# Patient Record
Sex: FEMALE | Race: WHITE | NOT HISPANIC OR LATINO | Employment: OTHER | URBAN - METROPOLITAN AREA
[De-identification: names, ages, dates, MRNs, and addresses within clinical notes are randomized per-mention and may not be internally consistent; named-entity substitution may affect disease eponyms.]

---

## 2018-06-08 LAB
ALBUMIN SERPL BCP-MCNC: 4.3 G/DL (ref 3.5–5.7)
ALP SERPL-CCNC: 82 IU/L (ref 40–150)
ALT SERPL W P-5'-P-CCNC: 26 IU/L (ref 0–50)
ANION GAP SERPL CALCULATED.3IONS-SCNC: 12.9 MM/L
AST SERPL W P-5'-P-CCNC: 30 U/L (ref 7–26)
BASOPHILS # BLD AUTO: 0 X3/UL (ref 0–0.3)
BASOPHILS # BLD AUTO: 0.4 % (ref 0–2)
BILIRUB SERPL-MCNC: 0.4 MG/DL (ref 0.3–1)
BILIRUB UR QL STRIP: NEGATIVE
BUN SERPL-MCNC: 12 MG/DL (ref 7–25)
C-REACTIVE PROTEIN (HISTORICAL): 2.6 MG/L
CALCIUM SERPL-MCNC: 9.2 MG/DL (ref 8.6–10.5)
CHLORIDE SERPL-SCNC: 101 MM/L (ref 98–107)
CHOLEST SERPL-MCNC: 194 MG/DL (ref 0–200)
CLARITY UR: CLEAR
CO2 SERPL-SCNC: 29 MM/L (ref 21–31)
COLOR UR: YELLOW
CREAT SERPL-MCNC: 0.7 MG/DL (ref 0.6–1.2)
DEPRECATED RDW RBC AUTO: 13.4 %
EGFR (HISTORICAL): > 60 GFR
EGFR AFRICAN AMERICAN (HISTORICAL): > 60 GFR
EOSINOPHIL # BLD AUTO: 0.1 X3/UL (ref 0–0.5)
EOSINOPHIL NFR BLD AUTO: 1.5 % (ref 0–5)
ERYTHROCYTE SEDIMENTATION RATE (HISTORICAL): 18 MM/HR
GLUCOSE (HISTORICAL): 77 MG/DL (ref 65–99)
GLUCOSE UR STRIP-MCNC: NEGATIVE MG/DL
HCT VFR BLD AUTO: 39.9 % (ref 37–47)
HDLC SERPL-MCNC: 80 MG/DL (ref 40–60)
HGB BLD-MCNC: 13.7 G/DL (ref 12–16)
HGB UR QL STRIP.AUTO: NEGATIVE
KETONES UR STRIP-MCNC: NEGATIVE MG/DL
LDLC SERPL CALC-MCNC: 100.2 MG/DL (ref 75–193)
LEUKOCYTE ESTERASE UR QL STRIP: NEGATIVE
LYMPHOCYTES # BLD AUTO: 2 X3/UL (ref 1.2–4.2)
LYMPHOCYTES NFR BLD AUTO: 31.2 % (ref 20.5–51.1)
MCH RBC QN AUTO: 31.6 PG (ref 26–34)
MCHC RBC AUTO-ENTMCNC: 34.2 G/DL (ref 31–37)
MCV RBC AUTO: 92.6 FL (ref 81–99)
MONOCYTES # BLD AUTO: 0.4 X3/UL (ref 0–1)
MONOCYTES NFR BLD AUTO: 6.4 % (ref 1.7–12)
NEUTROPHILS # BLD AUTO: 3.9 X3/UL (ref 1.4–6.5)
NEUTS SEG NFR BLD AUTO: 60.5 % (ref 42.2–75.2)
NITRITE UR QL STRIP: NEGATIVE
OSMOLALITY, SERUM (HISTORICAL): 276 MOSM (ref 262–291)
PH UR STRIP.AUTO: 7 [PH] (ref 4.5–8)
PLATELET # BLD AUTO: 241 X3/UL (ref 130–400)
PLATELET ESTIMATE (HISTORICAL): NORMAL
PMV BLD AUTO: 8.8 FL
POTASSIUM SERPL-SCNC: 3.9 MM/L (ref 3.5–5.5)
PROT UR STRIP-MCNC: NEGATIVE MG/DL
RBC # BLD AUTO: 4.31 X6/UL (ref 3.9–5.2)
RBC MORPHOLOGY (HISTORICAL): NORMAL
SODIUM SERPL-SCNC: 139 MM/L (ref 134–143)
SP GR UR STRIP.AUTO: 1.01 (ref 1–1.03)
TOTAL PROTEIN (HISTORICAL): 6.8 G/DL (ref 6.4–8.9)
TRIGL SERPL-MCNC: 67 MG/DL (ref 44–166)
TSH SERPL DL<=0.05 MIU/L-ACNC: 1.28 UIU/M (ref 0.45–5.33)
UROBILINOGEN UR QL STRIP.AUTO: 0.2 EU/DL (ref 0.2–8)
VLDL CHOLESTEROL (HISTORICAL): 13 MG/DL (ref 5–51)
WBC # BLD AUTO: 6.4 X3/UL (ref 4.8–10.8)
WBC #/AREA URNS AUTO: NORMAL /HPF

## 2018-06-09 LAB — HIV1 (HISTORICAL): NON REACTIVE

## 2019-06-12 ENCOUNTER — TRANSCRIBE ORDERS (OUTPATIENT)
Dept: ADMINISTRATIVE | Facility: HOSPITAL | Age: 49
End: 2019-06-12

## 2019-06-12 DIAGNOSIS — M06.9 RHEUMATOID ARTHRITIS, INVOLVING UNSPECIFIED SITE, UNSPECIFIED RHEUMATOID FACTOR PRESENCE: Primary | ICD-10-CM

## 2019-10-21 ENCOUNTER — OFFICE VISIT (OUTPATIENT)
Dept: URGENT CARE | Facility: CLINIC | Age: 49
End: 2019-10-21
Payer: MEDICARE

## 2019-10-21 VITALS
TEMPERATURE: 98.6 F | HEART RATE: 65 BPM | BODY MASS INDEX: 17.11 KG/M2 | WEIGHT: 109 LBS | RESPIRATION RATE: 16 BRPM | DIASTOLIC BLOOD PRESSURE: 60 MMHG | SYSTOLIC BLOOD PRESSURE: 108 MMHG | OXYGEN SATURATION: 98 % | HEIGHT: 67 IN

## 2019-10-21 DIAGNOSIS — S91.301A OPEN WOUND OF RIGHT FOOT, INITIAL ENCOUNTER: Primary | ICD-10-CM

## 2019-10-21 PROCEDURE — 87070 CULTURE OTHR SPECIMN AEROBIC: CPT | Performed by: FAMILY MEDICINE

## 2019-10-21 PROCEDURE — 99204 OFFICE O/P NEW MOD 45 MIN: CPT | Performed by: FAMILY MEDICINE

## 2019-10-21 PROCEDURE — 87186 SC STD MICRODIL/AGAR DIL: CPT | Performed by: FAMILY MEDICINE

## 2019-10-21 PROCEDURE — G0463 HOSPITAL OUTPT CLINIC VISIT: HCPCS | Performed by: FAMILY MEDICINE

## 2019-10-21 PROCEDURE — 87147 CULTURE TYPE IMMUNOLOGIC: CPT | Performed by: FAMILY MEDICINE

## 2019-10-21 PROCEDURE — 87205 SMEAR GRAM STAIN: CPT | Performed by: FAMILY MEDICINE

## 2019-10-21 RX ORDER — FUROSEMIDE 40 MG/1
40 TABLET ORAL DAILY
COMMUNITY
Start: 2019-09-03

## 2019-10-21 RX ORDER — DOXYCYCLINE 100 MG/1
100 TABLET ORAL 2 TIMES DAILY
Qty: 14 TABLET | Refills: 0 | Status: SHIPPED | OUTPATIENT
Start: 2019-10-21 | End: 2019-10-28

## 2019-10-21 RX ORDER — POTASSIUM CHLORIDE 20 MEQ/1
20 TABLET, EXTENDED RELEASE ORAL 2 TIMES DAILY
COMMUNITY
Start: 2019-04-04

## 2019-10-21 RX ORDER — PREDNISONE 1 MG/1
TABLET ORAL
Refills: 0 | COMMUNITY
Start: 2019-09-02

## 2019-10-21 RX ORDER — HYDROCODONE BITARTRATE AND ACETAMINOPHEN 7.5; 325 MG/1; MG/1
TABLET ORAL
COMMUNITY
Start: 2019-01-14

## 2019-10-22 NOTE — PROGRESS NOTES
[facility]        NAME: Vahe Mayberry is a 46 y o  female  : 1970    MRN: [id number]  DATE: 2019  TIME: 8:37 PM    Assessment and Plan   Open wound of right foot, initial encounter [S91 301A]  1  Open wound of right foot, initial encounter  Wound culture and Gram stain    doxycycline (ADOXA) 100 MG tablet         Patient Instructions   Right foot deep fluid collection overlying rheumatoid nodule - spontaneously drained  No evidence of current infection  Due to potential for infection we will send out a culture and start antibiotic prophylaxis  Doxycycline 100 mg -1 tablet by mouth twice daily with food/full glass of water for a week  Follow up with podiatry or orthopedic foot/ankle specialist     Follow up with PCP in 3-5 days  Proceed to  ER if symptoms worsen  Chief Complaint     Chief Complaint   Patient presents with   Nessa Ser     R bunion had some swelling over the past few days and then today it started having pus-like drainage         History of Present Illness       Patient is a very pleasant 47-year-old female with history of rheumatoid arthritis on chronic prednisone and Orencia  She presents with 3-4 day history of right medial foot lump which was painful red and started draining yesterday  After drainage pain went away  Fluid was clear, possibly slightly cloudy but not purulent  Patient described also as thick  There was no blood  She denies any trauma to the area  No fevers or chills  Review of Systems   Review of Systems   Constitutional: Negative for chills and fever  Skin: Positive for wound (Right foot wound)           Current Medications       Current Outpatient Medications:     abatacept (ORENCIA) 250 mg, Infuse into a venous catheter every 30 (thirty) days, Disp: , Rfl:     furosemide (LASIX) 40 mg tablet, Take 40 mg by mouth daily, Disp: , Rfl:     HYDROcodone-acetaminophen (NORCO) 7 5-325 mg per tablet, take 1 tablet by mouth every 12 hours if needed for pain, Disp: , Rfl:     potassium chloride (K-DUR,KLOR-CON) 20 mEq tablet, Take 20 mEq by mouth 2 (two) times a day, Disp: , Rfl:     predniSONE 5 mg tablet, , Disp: , Rfl: 0    doxycycline (ADOXA) 100 MG tablet, Take 1 tablet (100 mg total) by mouth 2 (two) times a day for 7 days, Disp: 14 tablet, Rfl: 0    Current Allergies     Allergies as of 10/21/2019    (No Known Allergies)            The following portions of the patient's history were reviewed and updated as appropriate: allergies, current medications, past family history, past medical history, past social history, past surgical history and problem list      Past Medical History:   Diagnosis Date    Rheumatoid arthritis ([facility] [address] )        Past Surgical History:   Procedure Laterality Date    TOTAL HIP ARTHROPLASTY Left        History reviewed  No pertinent family history  Medications have been verified  Objective   /60   Pulse 65   Temp 98 6 °F (37 °C) (Temporal)   Resp 16   Ht 5' 7" (1 702 m)   Wt 49 4 kg (109 lb)   SpO2 98%   BMI 17 07 kg/m²        Physical Exam     Physical Exam   Constitutional: She is oriented to person, place, and time  She appears well-developed and well-nourished  No distress  Musculoskeletal: She exhibits deformity (Rheumatoid deformity of the right foot with hammer toes 2 through 4    )  She exhibits no edema or tenderness  Neurological: She is alert and oriented to person, place, and time  No sensory deficit  She exhibits normal muscle tone  Skin: Skin is warm and dry  Capillary refill takes less than 2 seconds  No erythema  Right foot with hammertoes and rheumatoid nodule over the 1st MTP joint  There is a 2 cm round domed skin nodule with a small puncture  There is minimal amount of clear drainage  No significant erythema  No tenderness  Sensation is intact  Dorsalis pedis pulse palpable, capillary refill less than 2 seconds    There is soft tissue hyperplasia around the 1st MTP joint likely reactive to rheumatoid changes  Psychiatric: She has a normal mood and affect  Wound culture collected

## 2019-10-22 NOTE — PATIENT INSTRUCTIONS
Right foot deep fluid collection overlying rheumatoid nodule - spontaneously drained  No evidence of current infection  Due to potential for infection we will send out a culture and start antibiotic prophylaxis  Doxycycline 100 mg -1 tablet by mouth twice daily with food/full glass of water for a week    Follow up with podiatry or orthopedic foot/ankle specialist

## 2019-10-24 LAB
BACTERIA WND AEROBE CULT: ABNORMAL
GRAM STN SPEC: ABNORMAL

## 2024-07-04 ENCOUNTER — OFFICE VISIT (OUTPATIENT)
Dept: URGENT CARE | Facility: CLINIC | Age: 54
End: 2024-07-04
Payer: MEDICARE

## 2024-07-04 VITALS
BODY MASS INDEX: 18.58 KG/M2 | TEMPERATURE: 98.2 F | HEART RATE: 60 BPM | HEIGHT: 67 IN | WEIGHT: 118.4 LBS | SYSTOLIC BLOOD PRESSURE: 112 MMHG | RESPIRATION RATE: 20 BRPM | DIASTOLIC BLOOD PRESSURE: 55 MMHG | OXYGEN SATURATION: 98 %

## 2024-07-04 DIAGNOSIS — Z88.1 ALLERGY TO NEOSPORIN: ICD-10-CM

## 2024-07-04 DIAGNOSIS — L03.115 CELLULITIS OF RIGHT LOWER LEG: Primary | ICD-10-CM

## 2024-07-04 DIAGNOSIS — S81.801A UNSPECIFIED OPEN WOUND, RIGHT LOWER LEG, INITIAL ENCOUNTER: ICD-10-CM

## 2024-07-04 PROCEDURE — 99213 OFFICE O/P EST LOW 20 MIN: CPT | Performed by: PHYSICIAN ASSISTANT

## 2024-07-04 PROCEDURE — G0463 HOSPITAL OUTPT CLINIC VISIT: HCPCS | Performed by: PHYSICIAN ASSISTANT

## 2024-07-04 RX ORDER — GINSENG 100 MG
1 CAPSULE ORAL ONCE
Status: COMPLETED | OUTPATIENT
Start: 2024-07-04 | End: 2024-07-04

## 2024-07-04 RX ORDER — CEPHALEXIN 500 MG/1
500 CAPSULE ORAL EVERY 8 HOURS SCHEDULED
Qty: 21 CAPSULE | Refills: 0 | Status: SHIPPED | OUTPATIENT
Start: 2024-07-04 | End: 2024-07-11

## 2024-07-04 RX ORDER — TOCILIZUMAB 20 MG/ML
INJECTION, SOLUTION, CONCENTRATE INTRAVENOUS
COMMUNITY

## 2024-07-04 RX ADMIN — Medication 1 LARGE APPLICATION: at 11:30

## 2024-07-04 NOTE — PATIENT INSTRUCTIONS
Cover for the next 24 hours then start leaving it open to the air.  Keflex as directed.  Stop using neosporin.  Use bacitracin.

## 2024-07-04 NOTE — PROGRESS NOTES
[facility]    NAME: Jenise Khanna is a 53 y.o. female  : 1970    MRN: [id number]  DATE: 2024  TIME: 12:25 PM    Assessment and Plan   Cellulitis of right lower leg [L03.115]  1. Cellulitis of right lower leg  cephalexin (KEFLEX) 500 mg capsule      2. Allergy to Neosporin        3. Unspecified open wound, right lower leg, initial encounter  bacitracin topical ointment 1 large application          Patient Instructions     Patient Instructions   Cover for the next 24 hours then start leaving it open to the air.  Keflex as directed.  Stop using neosporin.  Use bacitracin.    Chief Complaint     Chief Complaint   Patient presents with    Wound Check     Right lower leg laceration from 2 weeks ago, now red and irritated        History of Present Illness   53-year-old female here with complaint of injected To the lateral aspect of her right lower leg.  Cut her leg about a week ago and just does not seem to be healing much.  Has been applying Neosporin and thinks that she might be allergic to it.  There is redness around the wound.  Also some pain and does not seem to be healing well.  No fever or chills.        Review of Systems   Review of Systems   Constitutional:  Negative for chills and fever.   Respiratory:  Negative for cough and shortness of breath.    Cardiovascular:  Negative for chest pain.   Skin:  Positive for color change and wound.       Current Medications     Current Outpatient Medications:     cephalexin (KEFLEX) 500 mg capsule, Take 1 capsule (500 mg total) by mouth every 8 (eight) hours for 7 days, Disp: 21 capsule, Rfl: 0    furosemide (LASIX) 40 mg tablet, Take 40 mg by mouth daily, Disp: , Rfl:     HYDROcodone-acetaminophen (NORCO) 7.5-325 mg per tablet, take 1 tablet by mouth every 12 hours if needed for pain, Disp: , Rfl:     predniSONE 5 mg tablet, , Disp: , Rfl: 0    tocilizumab (Actemra) 80 mg/4 mL, Inject into a catheter in a vein, Disp: , Rfl:     abatacept  "(ORENCIA) 250 mg, Infuse into a venous catheter every 30 (thirty) days (Patient not taking: Reported on 7/4/2024), Disp: , Rfl:     potassium chloride (K-DUR,KLOR-CON) 20 mEq tablet, Take 20 mEq by mouth 2 (two) times a day (Patient not taking: Reported on 7/4/2024), Disp: , Rfl:   No current facility-administered medications for this visit.    Current Allergies     Allergies as of 07/04/2024    (No Known Allergies)          The following portions of the patient's history were reviewed and updated as appropriate: allergies, current medications, past family history, past medical history, past social history, past surgical history and problem list.   Past Medical History:   Diagnosis Date    Rheumatoid arthritis (HCC)      Past Surgical History:   Procedure Laterality Date    TOTAL HIP ARTHROPLASTY Left      History reviewed. No pertinent family history.  Social History     Socioeconomic History    Marital status: Single     Spouse name: Not on file    Number of children: Not on file    Years of education: Not on file    Highest education level: Not on file   Occupational History    Not on file   Tobacco Use    Smoking status: Never    Smokeless tobacco: Never   Substance and Sexual Activity    Alcohol use: Not Currently    Drug use: Not Currently    Sexual activity: Not on file   Other Topics Concern    Not on file   Social History Narrative    Not on file     Social Determinants of Health     Financial Resource Strain: Not on file   Food Insecurity: Not on file   Transportation Needs: Not on file   Physical Activity: Not on file   Stress: Not on file   Social Connections: Not on file   Intimate Partner Violence: Not on file   Housing Stability: Not on file     Medications have been verified.    Objective   /55   Pulse 60   Temp 98.2 °F (36.8 °C)   Resp 20   Ht 5' 7\" (1.702 m)   Wt 53.7 kg (118 lb 6.4 oz)   SpO2 98%   BMI 18.54 kg/m²      Physical Exam   Physical Exam  Vitals and nursing note reviewed. "   Constitutional:       Appearance: Normal appearance.   Cardiovascular:      Rate and Rhythm: Normal rate and regular rhythm.      Pulses: Normal pulses.      Heart sounds: Normal heart sounds.   Pulmonary:      Effort: Pulmonary effort is normal.   Musculoskeletal:      Cervical back: Normal range of motion.   Skin:     Findings: Erythema present.      Comments: Scabbed over wound/laceration lateral aspect of right lower leg.  There is surrounding erythema.  Slightly warm to touch.   Neurological:      Mental Status: She is alert.